# Patient Record
Sex: FEMALE | Race: WHITE | NOT HISPANIC OR LATINO
[De-identification: names, ages, dates, MRNs, and addresses within clinical notes are randomized per-mention and may not be internally consistent; named-entity substitution may affect disease eponyms.]

---

## 2017-06-16 ENCOUNTER — OTHER (OUTPATIENT)
Age: 52
End: 2017-06-16

## 2019-10-09 ENCOUNTER — TRANSCRIPTION ENCOUNTER (OUTPATIENT)
Age: 54
End: 2019-10-09

## 2019-12-06 ENCOUNTER — APPOINTMENT (OUTPATIENT)
Dept: GASTROENTEROLOGY | Facility: CLINIC | Age: 54
End: 2019-12-06
Payer: COMMERCIAL

## 2019-12-06 VITALS
OXYGEN SATURATION: 100 % | HEART RATE: 71 BPM | DIASTOLIC BLOOD PRESSURE: 77 MMHG | BODY MASS INDEX: 26.46 KG/M2 | WEIGHT: 155 LBS | HEIGHT: 64 IN | SYSTOLIC BLOOD PRESSURE: 109 MMHG

## 2019-12-06 DIAGNOSIS — Z80.0 FAMILY HISTORY OF MALIGNANT NEOPLASM OF DIGESTIVE ORGANS: ICD-10-CM

## 2019-12-06 DIAGNOSIS — Z12.11 ENCOUNTER FOR SCREENING FOR MALIGNANT NEOPLASM OF COLON: ICD-10-CM

## 2019-12-06 DIAGNOSIS — R14.0 ABDOMINAL DISTENSION (GASEOUS): ICD-10-CM

## 2019-12-06 DIAGNOSIS — R10.9 UNSPECIFIED ABDOMINAL PAIN: ICD-10-CM

## 2019-12-06 PROCEDURE — 99204 OFFICE O/P NEW MOD 45 MIN: CPT

## 2019-12-06 RX ORDER — SODIUM SULFATE, POTASSIUM SULFATE, MAGNESIUM SULFATE 17.5; 3.13; 1.6 G/ML; G/ML; G/ML
17.5-3.13-1.6 SOLUTION, CONCENTRATE ORAL
Qty: 1 | Refills: 0 | Status: ACTIVE | COMMUNITY
Start: 2019-12-06 | End: 1900-01-01

## 2019-12-06 NOTE — HISTORY OF PRESENT ILLNESS
[de-identified] : 54-year-old woman with history of asthma who presents with complaints of abdominal pain and changes in her bowel habits.\par \par The patient reports that her mother had colon cancer in her late 40s.  Maternal cousin in 40's had colon cancer.  She says her father  from liver cirrhosis. \par \par She's had about 3 or 4 colonoscopies in her lifetime. Her last colonoscopy was 5 years ago. No colon polyps are ever discovered.\par \par She's had an upper endoscopy more than 5 years ago. From her recollection nothing serious was seen.\par \par She says she's had digestive issues since being a young child. For the past year or more she's had left sided abdominal pain. She says this pain can last for several hours. She says the pain is 5/10 on a pain scale.  At times pain gets better after belching or having a bowel movement.  \par \par She's also noticed a change in her bowel habits since the past 6 months. She used to go to bathroom once a day. She now goes to the bathroom maybe every 3 days. She does not feel empty after a bowel movement. She denies any melena, hematochezia, abnormal weight loss.\par \par She says after eating certain foods like salads she will have abdominal pain.\par \par Yesterday for the first time she was nauseous all day. She denies any vomiting.\par \par She denies any heartburn, reflux, regurgitation, dysphagia, and odynophagia.\par \par She is worried about leaky gut.  \par \par All other review of systems are negative.  Denies cardiac symptoms.

## 2019-12-06 NOTE — PHYSICAL EXAM
[General Appearance - Alert] : alert [General Appearance - In No Acute Distress] : in no acute distress [Sclera] : the sclera and conjunctiva were normal [Extraocular Movements] : extraocular movements were intact [Outer Ear] : the ears and nose were normal in appearance [Hearing Threshold Finger Rub Not [place]] : hearing was normal [Neck Appearance] : the appearance of the neck was normal [Neck Cervical Mass (___cm)] : no neck mass was observed [Auscultation Breath Sounds / Voice Sounds] : lungs were clear to auscultation bilaterally [Heart Rate And Rhythm] : heart rate was normal and rhythm regular [Heart Sounds] : normal S1 and S2 [Heart Sounds Gallop] : no gallops [Murmurs] : no murmurs [Heart Sounds Pericardial Friction Rub] : no pericardial rub [Bowel Sounds] : normal bowel sounds [Abdomen Soft] : soft [Abdomen Tenderness] : non-tender [Cervical Lymph Nodes Enlarged Posterior Bilaterally] : posterior cervical [Cervical Lymph Nodes Enlarged Anterior Bilaterally] : anterior cervical [Supraclavicular Lymph Nodes Enlarged Bilaterally] : supraclavicular [Abnormal Walk] : normal gait [Nail Clubbing] : no clubbing  or cyanosis of the fingernails [Skin Color & Pigmentation] : normal skin color and pigmentation [] : no rash [Oriented To Time, Place, And Person] : oriented to person, place, and time [Impaired Insight] : insight and judgment were intact [Affect] : the affect was normal

## 2019-12-06 NOTE — ASSESSMENT
[FreeTextEntry1] : 54-year-old woman with history of asthma, family hx of colon cancer who presents with complaints of abdominal pain and changes in her bowel habits.\par \par I will plan for an upper endoscopy under monitored anesthesia care to rule out peptic ulcer disease, H.pylori gastritis etc.   I will therefore plan for a colonoscopy to rule out colon polyps, colorectal cancer etc. under monitored anesthesia care.  Risks such as perforation requiring surgery, bleeding, infection, colitis, missed colon cancer, internal organ injury, etc, risks of bowel prep including colitis, syncope etc. and risks of anesthesia including cardiopulmonary compromise were discussed with patient.  Patient verbalized understanding and agrees to proceed with the planned procedure.\par \par Metamucil once a day\par \par Advised genetic testing - contact information for department of medical genetics given.  \par \par If about procedures negative and patient continues to have ongoing pain, will consider CT scan. \par

## 2020-03-12 ENCOUNTER — TRANSCRIPTION ENCOUNTER (OUTPATIENT)
Age: 55
End: 2020-03-12

## 2020-03-13 ENCOUNTER — RESULT REVIEW (OUTPATIENT)
Age: 55
End: 2020-03-13

## 2020-03-13 ENCOUNTER — APPOINTMENT (OUTPATIENT)
Dept: GASTROENTEROLOGY | Facility: HOSPITAL | Age: 55
End: 2020-03-13

## 2020-03-13 ENCOUNTER — OUTPATIENT (OUTPATIENT)
Dept: OUTPATIENT SERVICES | Facility: HOSPITAL | Age: 55
LOS: 1 days | End: 2020-03-13
Payer: COMMERCIAL

## 2020-03-13 DIAGNOSIS — K21.9 GASTRO-ESOPHAGEAL REFLUX DISEASE WITHOUT ESOPHAGITIS: ICD-10-CM

## 2020-03-13 DIAGNOSIS — Z12.11 ENCOUNTER FOR SCREENING FOR MALIGNANT NEOPLASM OF COLON: ICD-10-CM

## 2020-03-13 LAB — HCG UR QL: NEGATIVE — SIGNIFICANT CHANGE UP

## 2020-03-13 PROCEDURE — 43239 EGD BIOPSY SINGLE/MULTIPLE: CPT

## 2020-03-13 PROCEDURE — 88305 TISSUE EXAM BY PATHOLOGIST: CPT

## 2020-03-13 PROCEDURE — 81025 URINE PREGNANCY TEST: CPT

## 2020-03-13 PROCEDURE — 88312 SPECIAL STAINS GROUP 1: CPT

## 2020-03-13 PROCEDURE — 88305 TISSUE EXAM BY PATHOLOGIST: CPT | Mod: 26

## 2020-03-13 PROCEDURE — 45378 DIAGNOSTIC COLONOSCOPY: CPT

## 2020-03-13 PROCEDURE — 88312 SPECIAL STAINS GROUP 1: CPT | Mod: 26

## 2020-03-13 PROCEDURE — G0105: CPT

## 2020-12-21 PROBLEM — Z12.11 ENCOUNTER FOR SCREENING COLONOSCOPY: Status: RESOLVED | Noted: 2019-12-06 | Resolved: 2020-12-21

## 2022-03-28 ENCOUNTER — EMERGENCY (EMERGENCY)
Facility: HOSPITAL | Age: 57
LOS: 0 days | Discharge: HOME | End: 2022-03-29
Attending: EMERGENCY MEDICINE
Payer: COMMERCIAL

## 2022-03-28 VITALS — HEIGHT: 64 IN | WEIGHT: 154.98 LBS

## 2022-03-28 DIAGNOSIS — R10.9 UNSPECIFIED ABDOMINAL PAIN: ICD-10-CM

## 2022-03-28 LAB
ALBUMIN SERPL ELPH-MCNC: 4 G/DL — SIGNIFICANT CHANGE UP (ref 3.3–5)
ALP SERPL-CCNC: 48 U/L — SIGNIFICANT CHANGE UP (ref 40–120)
ALT FLD-CCNC: 25 U/L — SIGNIFICANT CHANGE UP (ref 12–78)
ANION GAP SERPL CALC-SCNC: 4 MMOL/L — LOW (ref 5–17)
APPEARANCE UR: CLEAR — SIGNIFICANT CHANGE UP
AST SERPL-CCNC: 30 U/L — SIGNIFICANT CHANGE UP (ref 15–37)
BACTERIA # UR AUTO: NEGATIVE — SIGNIFICANT CHANGE UP
BASOPHILS # BLD AUTO: 0.04 K/UL — SIGNIFICANT CHANGE UP (ref 0–0.2)
BASOPHILS NFR BLD AUTO: 0.4 % — SIGNIFICANT CHANGE UP (ref 0–2)
BILIRUB SERPL-MCNC: 1.2 MG/DL — SIGNIFICANT CHANGE UP (ref 0.2–1.2)
BILIRUB UR-MCNC: NEGATIVE — SIGNIFICANT CHANGE UP
BUN SERPL-MCNC: 18 MG/DL — SIGNIFICANT CHANGE UP (ref 7–23)
CALCIUM SERPL-MCNC: 9 MG/DL — SIGNIFICANT CHANGE UP (ref 8.5–10.1)
CHLORIDE SERPL-SCNC: 105 MMOL/L — SIGNIFICANT CHANGE UP (ref 96–108)
CO2 SERPL-SCNC: 28 MMOL/L — SIGNIFICANT CHANGE UP (ref 22–31)
COLOR SPEC: YELLOW — SIGNIFICANT CHANGE UP
CREAT SERPL-MCNC: 0.75 MG/DL — SIGNIFICANT CHANGE UP (ref 0.5–1.3)
DIFF PNL FLD: ABNORMAL
EGFR: 93 ML/MIN/1.73M2 — SIGNIFICANT CHANGE UP
EOSINOPHIL # BLD AUTO: 0.51 K/UL — HIGH (ref 0–0.5)
EOSINOPHIL NFR BLD AUTO: 4.6 % — SIGNIFICANT CHANGE UP (ref 0–6)
EPI CELLS # UR: SIGNIFICANT CHANGE UP
GLUCOSE SERPL-MCNC: 95 MG/DL — SIGNIFICANT CHANGE UP (ref 70–99)
GLUCOSE UR QL: NEGATIVE — SIGNIFICANT CHANGE UP
HCT VFR BLD CALC: 40 % — SIGNIFICANT CHANGE UP (ref 34.5–45)
HGB BLD-MCNC: 13.7 G/DL — SIGNIFICANT CHANGE UP (ref 11.5–15.5)
IMM GRANULOCYTES NFR BLD AUTO: 0.3 % — SIGNIFICANT CHANGE UP (ref 0–1.5)
KETONES UR-MCNC: ABNORMAL
LACTATE SERPL-SCNC: 0.8 MMOL/L — SIGNIFICANT CHANGE UP (ref 0.7–2)
LEUKOCYTE ESTERASE UR-ACNC: ABNORMAL
LIDOCAIN IGE QN: 297 U/L — SIGNIFICANT CHANGE UP (ref 73–393)
LYMPHOCYTES # BLD AUTO: 1.85 K/UL — SIGNIFICANT CHANGE UP (ref 1–3.3)
LYMPHOCYTES # BLD AUTO: 16.6 % — SIGNIFICANT CHANGE UP (ref 13–44)
MCHC RBC-ENTMCNC: 31.7 PG — SIGNIFICANT CHANGE UP (ref 27–34)
MCHC RBC-ENTMCNC: 34.3 GM/DL — SIGNIFICANT CHANGE UP (ref 32–36)
MCV RBC AUTO: 92.6 FL — SIGNIFICANT CHANGE UP (ref 80–100)
MONOCYTES # BLD AUTO: 0.7 K/UL — SIGNIFICANT CHANGE UP (ref 0–0.9)
MONOCYTES NFR BLD AUTO: 6.3 % — SIGNIFICANT CHANGE UP (ref 2–14)
NEUTROPHILS # BLD AUTO: 8.04 K/UL — HIGH (ref 1.8–7.4)
NEUTROPHILS NFR BLD AUTO: 71.8 % — SIGNIFICANT CHANGE UP (ref 43–77)
NITRITE UR-MCNC: NEGATIVE — SIGNIFICANT CHANGE UP
PH UR: 5 — SIGNIFICANT CHANGE UP (ref 5–8)
PLATELET # BLD AUTO: 222 K/UL — SIGNIFICANT CHANGE UP (ref 150–400)
POTASSIUM SERPL-MCNC: 4 MMOL/L — SIGNIFICANT CHANGE UP (ref 3.5–5.3)
POTASSIUM SERPL-SCNC: 4 MMOL/L — SIGNIFICANT CHANGE UP (ref 3.5–5.3)
PROT SERPL-MCNC: 7.2 GM/DL — SIGNIFICANT CHANGE UP (ref 6–8.3)
PROT UR-MCNC: NEGATIVE — SIGNIFICANT CHANGE UP
RBC # BLD: 4.32 M/UL — SIGNIFICANT CHANGE UP (ref 3.8–5.2)
RBC # FLD: 12.1 % — SIGNIFICANT CHANGE UP (ref 10.3–14.5)
RBC CASTS # UR COMP ASSIST: SIGNIFICANT CHANGE UP /HPF (ref 0–4)
SODIUM SERPL-SCNC: 137 MMOL/L — SIGNIFICANT CHANGE UP (ref 135–145)
SP GR SPEC: 1.01 — SIGNIFICANT CHANGE UP (ref 1.01–1.02)
UROBILINOGEN FLD QL: NEGATIVE — SIGNIFICANT CHANGE UP
WBC # BLD: 11.17 K/UL — HIGH (ref 3.8–10.5)
WBC # FLD AUTO: 11.17 K/UL — HIGH (ref 3.8–10.5)
WBC UR QL: SIGNIFICANT CHANGE UP

## 2022-03-28 PROCEDURE — 96374 THER/PROPH/DIAG INJ IV PUSH: CPT

## 2022-03-28 PROCEDURE — 74176 CT ABD & PELVIS W/O CONTRAST: CPT | Mod: MA

## 2022-03-28 PROCEDURE — 85025 COMPLETE CBC W/AUTO DIFF WBC: CPT

## 2022-03-28 PROCEDURE — 74177 CT ABD & PELVIS W/CONTRAST: CPT | Mod: 26,MA

## 2022-03-28 PROCEDURE — 74177 CT ABD & PELVIS W/CONTRAST: CPT | Mod: MA

## 2022-03-28 PROCEDURE — 99285 EMERGENCY DEPT VISIT HI MDM: CPT

## 2022-03-28 PROCEDURE — 87086 URINE CULTURE/COLONY COUNT: CPT

## 2022-03-28 PROCEDURE — 80053 COMPREHEN METABOLIC PANEL: CPT

## 2022-03-28 PROCEDURE — 36415 COLL VENOUS BLD VENIPUNCTURE: CPT

## 2022-03-28 PROCEDURE — 99284 EMERGENCY DEPT VISIT MOD MDM: CPT | Mod: 25

## 2022-03-28 PROCEDURE — 83690 ASSAY OF LIPASE: CPT

## 2022-03-28 PROCEDURE — 81001 URINALYSIS AUTO W/SCOPE: CPT

## 2022-03-28 PROCEDURE — 83605 ASSAY OF LACTIC ACID: CPT

## 2022-03-28 RX ORDER — KETOROLAC TROMETHAMINE 30 MG/ML
15 SYRINGE (ML) INJECTION ONCE
Refills: 0 | Status: DISCONTINUED | OUTPATIENT
Start: 2022-03-28 | End: 2022-03-28

## 2022-03-28 RX ORDER — SODIUM CHLORIDE 9 MG/ML
1000 INJECTION INTRAMUSCULAR; INTRAVENOUS; SUBCUTANEOUS ONCE
Refills: 0 | Status: COMPLETED | OUTPATIENT
Start: 2022-03-28 | End: 2022-03-28

## 2022-03-28 RX ADMIN — SODIUM CHLORIDE 2000 MILLILITER(S): 9 INJECTION INTRAMUSCULAR; INTRAVENOUS; SUBCUTANEOUS at 21:53

## 2022-03-28 RX ADMIN — Medication 15 MILLIGRAM(S): at 21:53

## 2022-03-28 NOTE — ED STATDOCS - CLINICAL SUMMARY MEDICAL DECISION MAKING FREE TEXT BOX
will evaluate for SBO vs. appy vs. biliary colic vs. colitis. dispo pending, labs imaging and reassessment.

## 2022-03-28 NOTE — ED STATDOCS - GASTROINTESTINAL, MLM
abdomen soft, and non-distended. Bowel sounds present.  + moderate diffuse TTP, no rebound or guarding

## 2022-03-28 NOTE — ED STATDOCS - PATIENT PORTAL LINK FT
You can access the FollowMyHealth Patient Portal offered by [facility] by registering at the following website: [email or web address]. By joining [employer]’s FollowMyHealth portal, you will also be able to view your health information using other applications (apps) compatible with our system.

## 2022-03-28 NOTE — ED ADULT NURSE NOTE - OBJECTIVE STATEMENT
Pt. reports to ED for abdominal pain.  Pt. reports for past few weeks intermittent abdominal pain in RLQ.  Pt. reports pain came back today RLQ pain with radiation to LLQ.  Pt. reports nausea, denies vomiting, fevers, chest pain, SOB.

## 2022-03-28 NOTE — ED STATDOCS - CARE PROVIDER_API CALL
Jerry Schrader)  Surgery  [address], [address]  [place] [address]  Phone: [phone]  Fax: [phone]  Follow Up Time:     Jerry Piedra)  ColonRectal Surgery; Surgery  [address], [address]  [place] [address]  Phone: [phone]  Fax: [phone]  Follow Up Time:

## 2022-03-28 NOTE — ED ADULT NURSE NOTE - NSIMPLEMENTINTERV_GEN_ALL_ED
Implemented All Universal Safety Interventions:  [place] to call system. Call bell, personal items and telephone within reach. Instruct patient to call for assistance. Room bathroom lighting operational. Non-slip footwear when patient is off stretcher. Physically safe environment: no spills, clutter or unnecessary equipment. Stretcher in lowest position, wheels locked, appropriate side rails in place.

## 2022-03-28 NOTE — ED STATDOCS - NSFOLLOWUPINSTRUCTIONS_ED_ALL_ED_FT
Please see your gastroenterologist within 1-2 days .  See a general surgeon or colorectal surgeon within 1-2 weeks.    take tylenol or ibuprofen as needed for pain.     Clears and soft food for 1-2 days then meals as tolerated.

## 2022-03-28 NOTE — ED ADULT TRIAGE NOTE - CHIEF COMPLAINT QUOTE
Pt c/o intermittent RLQ pain radiating to the rest of the abdomen, b/l flank pain, and nausea since 4pm today. Did not take any pain medication PTA. Denies vomiting/diarrhea, chest pain, SOB, urinary symptoms, and fevers.

## 2022-03-28 NOTE — ED STATDOCS - OBJECTIVE STATEMENT
56 F no hx here c/o right sided burning abdominal pain x 3PM today. pt reports she had multiple episodes of similar abd pain in past few weeks. reports associated symptoms of nausea and abdominal bloating.  pt reports that today she had a normal BM but that a few days ago, her BM was "gooey". no vomiting.  no fever, chills, dysuria, hematuria. no hx of kidney stones. no hx of abdominal surgeries.  no recent antibiotic use. no hx of diverticulosis, diverticulitis.

## 2022-03-28 NOTE — ED STATDOCS - PROGRESS NOTE DETAILS
pt reeval and aware of her labs and ct results. spoke to Surgical resident and will consult with attending of ct finding, may need ct abd/pelvis with oral contrast. -Angela Au PA-C pt aware she may have to have CT with oral contrast and agrees with plan. -Angela Au PA-C ARYAG:  Received signout from Dr. Iverson to follow up CT study with PO contrast which now is normal and does not show intussusception.  Surgery resident states ok to discharge at this time.  Patient to follow up with her gastroenterologist in [place] and general surgery or colorectal surgery.

## 2022-03-29 VITALS
SYSTOLIC BLOOD PRESSURE: 114 MMHG | TEMPERATURE: 98 F | DIASTOLIC BLOOD PRESSURE: 80 MMHG | HEART RATE: 100 BPM | RESPIRATION RATE: 19 BRPM | OXYGEN SATURATION: 100 %

## 2022-03-29 PROCEDURE — 74176 CT ABD & PELVIS W/O CONTRAST: CPT | Mod: 26,MA

## 2022-03-29 RX ORDER — SODIUM CHLORIDE 9 MG/ML
1000 INJECTION INTRAMUSCULAR; INTRAVENOUS; SUBCUTANEOUS ONCE
Refills: 0 | Status: COMPLETED | OUTPATIENT
Start: 2022-03-29 | End: 2022-03-29

## 2022-03-29 RX ADMIN — SODIUM CHLORIDE 1000 MILLILITER(S): 9 INJECTION INTRAMUSCULAR; INTRAVENOUS; SUBCUTANEOUS at 01:29

## 2022-03-29 NOTE — ED ADULT NURSE REASSESSMENT NOTE - NS ED NURSE REASSESS COMMENT FT1
Assumed care of patient from Nicol CASTILLO. Patient resting comfortably in bed A&OX4. Respirations even and unlabored. Skin warm and dry. Patient updated on POC. Will continue to monitor.

## 2022-03-29 NOTE — CONSULT NOTE ADULT - ASSESSMENT
55 yo F with abdominal pain with possible intussusception  wbc 11  LA 0.8    Plan;  Please obtain CT a/p w/oral contrast  zofran prn n/v  pain control  IVF      Plan d/w Dr. Schrader 57 yo F with abdominal pain with possible intussusception  wbc 11  LA 0.8    Plan;  Please obtain CT a/p w/oral contrast  zofran prn n/v  pain control  IVF      Plan d/w Dr. Schrader    Addendum:    CT a/p w/oral contrast reviewed with radiologist, the suspected intussusception on prior CT has resolved on repeat, pt is clinically better and denies any abdominal pain.  CT official report: Oral contrast has passed into the colon. Previously seen small bowel   intussusception has resolved. There is no obstruction. No mass is seen on   this exam.    Pt can be discharged with oral pain medications  advised if she develops similar pain to rtn to ED    plan d/w Dr. Schrader

## 2022-03-29 NOTE — CONSULT NOTE ADULT - SUBJECTIVE AND OBJECTIVE BOX
Surgery Consultation    57 yo F with PMH of Asthma seen in the ED with c/o right sided burning abdominal pain earlier this afternoon. Pt reports she had multiple episodes of similar abd pain in past few weeks associated w/nausea but no vomiting and abdominal bloating.  Pt reports she had nl BM earlier today denies passing flatus. Denies fever or chills, v/d/c.    PAST MEDICAL & SURGICAL HISTORY:    PMH: Asthma  PSH: Denies    Allergies:  suflur- GI upset    SH: denies toxic habits    Home Medications:      Family History:  FAMILY HISTORY:      ROS:  Constitutional: Denies fever, fatigue or weight loss.  Skin: Denies rash.  Eyes: Denies recent vision problems or eye pain.  ENT: Denies congestion, ear pain, or sore throat.  Endocrine: Denies thyroid problems.  Cardiovascular: Denies chest pain or palpation.  Respiratory: Denies cough, shortness of breath, congestion, or wheezing.  Gastrointestinal: + abdominal pain, +nausea, denies vomiting, or diarrhea.  Genitourinary: Denies dysuria.  Musculoskeletal: Denies joint swelling.  Neurologic: Denies headache.      Physical Examination:  AVSS  GENERAL: No acute distress, well-developed  HEAD:  Atraumatic, Normocephalic  CHEST/LUNG: CTAB; No wheezes, rales, or rhonchi  HEART: Regular rate and rhythm; No murmurs, rubs, or gallops  ABDOMEN: Soft, mild tenderness RLQ, non-distended; bowel sounds+  ext:no cyanosis or edema  NEUROLOGY: A&O x 3, no focal deficits    Data:                        13.7   11.17 )-----------( 222      ( 28 Mar 2022 21:05 )             40.0     -    137  |  105  |  18  ----------------------------<  95  4.0   |  28  |  0.75    Ca    9.0      28 Mar 2022 21:05    TPro  7.2  /  Alb  4.0  /  TBili  1.2  /  DBili  x   /  AST  30  /  ALT  25  /  AlkPhos  48  -      LIVER FUNCTIONS - ( 28 Mar 2022 21:05 )  Alb: 4.0 g/dL / Pro: 7.2 gm/dL / ALK PHOS: 48 U/L / ALT: 25 U/L / AST: 30 U/L / GGT: x           Urinalysis Basic - ( 28 Mar 2022 22:17 )    Color: Yellow / Appearance: Clear / S.015 / pH: x  Gluc: x / Ketone: Moderate  / Bili: Negative / Urobili: Negative   Blood: x / Protein: Negative / Nitrite: Negative   Leuk Esterase: Trace / RBC: 0-2 /HPF / WBC 0-2   Sq Epi: x / Non Sq Epi: Occasional / Bacteria: Negative        Radiology:    CT a/p w/iv contrast: At the lower midline to the right lower quadrant abdomen there are is an   intussusception of distal small bowel containing fat. At the point of   intussusception there is a prominent 3.9 x 2.5 cm focal area of solid   soft tissue without air. This could be compressed bowel however a solid   mass as an intussusception lead point is not excluded. Proximal to this   the small bowel is fluid-filled and mildly prominent in diameter   measuring up to 2 cm relative to the bowel distal to the intussusception   (2:82-83) (602:31-43). There is no surrounding inflammatory change. There   is no free air. This may be the cause of the patient's pain.

## 2022-03-30 LAB
CULTURE RESULTS: SIGNIFICANT CHANGE UP
SPECIMEN SOURCE: SIGNIFICANT CHANGE UP

## 2022-04-01 ENCOUNTER — APPOINTMENT (OUTPATIENT)
Dept: GASTROENTEROLOGY | Facility: CLINIC | Age: 57
End: 2022-04-01
Payer: COMMERCIAL

## 2022-04-01 ENCOUNTER — NON-APPOINTMENT (OUTPATIENT)
Age: 57
End: 2022-04-01

## 2022-04-01 PROCEDURE — 99214 OFFICE O/P EST MOD 30 MIN: CPT

## 2022-04-01 NOTE — ASSESSMENT
[FreeTextEntry1] : IMPRESSION:\par #  RLQ pain - [facility] ER visit 3/28/2022\par -  CT scan A/P with oral contrast only 3/29/2022 - oral contrast passed into the colon - previous small bowel intussusception has resolved.  No mass, no obstruction\par -  CT scan A/P with IV contrast on 3/28/2022 - Lower midline to the RLQ, there is an intussusception of distal small bowel containing fat.  At the point of intussusception is a prominent 3.9 x 2.5 cm focal are of solid soft tissue without air.  This could be compressed small bowel however a solid mass as an intussusception lead point is not excluded. \par -  WBC of 11.7\par -  Colonoscopy in 3/13/2020:  Mild hemorrhoids.  Good prep. Repeat colonoscopy in 5 years or earlier depending on genetic testing. \par -   3 or 4 colonoscopies prior to 2020. No colon polyps are ever discovered.\par \par #  Years of left sided abdominal pain, better after belching or having a BM\par -  EGD 3/13/2020 by Dr. Patterson:  Normal GE s/p bx (negative pathology), Mild antral erythema s/p bx (negative for IM and HP), normal duodenum s/p bx (negative for celiac disease) \par \par #  Family history of colon cancer\par -  Mother had colon cancer in her late 40s. Maternal cousin in 40's had colon cancer. \par -  Previously advised genetic testing - contact information for department of medical genetics given. \par \par #  Comorbidities:  History of asthma\par \par \par PLAN: \par Unclear cause of small bowel intussusception - discussed a capsule endoscopy to r/o small bowel lesion etc risk reviewed such as bowel obstruction requiring operation, missed lesions etc  - alternatives discussed such as MR or CT enterography - limits reviewed such as missed lesions- she would like to arrange for a capsule endoscopy - she was advised not to have it at time of abdominal pain - she verbalized understanding. \par \par Discussed a colonoscopy since new onset constipation and family history - risks/benefits reviewed - she will hold off until assessment of small bowel.  MiraLAX once a day for constipation.\par \par Advised if sever abdominal pain - go to ER.

## 2022-04-01 NOTE — PHYSICAL EXAM
[General Appearance - Alert] : alert [General Appearance - In No Acute Distress] : in no acute distress [Sclera] : the sclera and conjunctiva were normal [Extraocular Movements] : extraocular movements were intact [Outer Ear] : the ears and nose were normal in appearance [Hearing Threshold Finger Rub Not [place]] : hearing was normal [Neck Appearance] : the appearance of the neck was normal [Neck Cervical Mass (___cm)] : no neck mass was observed [Auscultation Breath Sounds / Voice Sounds] : lungs were clear to auscultation bilaterally [Heart Rate And Rhythm] : heart rate was normal and rhythm regular [Heart Sounds] : normal S1 and S2 [Heart Sounds Gallop] : no gallops [Murmurs] : no murmurs [Heart Sounds Pericardial Friction Rub] : no pericardial rub [Bowel Sounds] : normal bowel sounds [Abdomen Soft] : soft [Abdomen Tenderness] : non-tender [Abdomen Mass (___ Cm)] : no abdominal mass palpated [Cervical Lymph Nodes Enlarged Posterior Bilaterally] : posterior cervical [Cervical Lymph Nodes Enlarged Anterior Bilaterally] : anterior cervical [Supraclavicular Lymph Nodes Enlarged Bilaterally] : supraclavicular [Abnormal Walk] : normal gait [Nail Clubbing] : no clubbing  or cyanosis of the fingernails [Skin Color & Pigmentation] : normal skin color and pigmentation [] : no rash [Oriented To Time, Place, And Person] : oriented to person, place, and time [Impaired Insight] : insight and judgment were intact [Affect] : the affect was normal

## 2022-04-01 NOTE — HISTORY OF PRESENT ILLNESS
[de-identified] : 56-year-old woman with history of asthma who presents with complaints of abdominal pain and changes in her bowel habits.\par \par Went to [facility] on 3/28/2022 for abdominal pain - RLQ - 4th episode (first ER visit) - feels like gas- bloated - then pain - sever pain - symptoms can last for several hours.  She gets nausea but no vomiting. \par \par She gets constipation  has been having irregular bowel movements since 2022.  \par Patient denies having weight loss, melena and hematochezia.\par \par \par \par \par Initial office visit 2019:  \par The patient reports that her mother had colon cancer in her late 40s. Maternal cousin in 40's had colon cancer. She says her father  from liver cirrhosis. \par \par She's had about 3 or 4 colonoscopies in her lifetime. Her last colonoscopy was 5 years ago. No colon polyps are ever discovered.\par \par She's had an upper endoscopy more than 5 years ago. From her recollection nothing serious was seen.\par \par She says she's had digestive issues since being a young child. For the past year or more she's had left sided abdominal pain. She says this pain can last for several hours. She says the pain is 5/10 on a pain scale. At times pain gets better after belching or having a bowel movement. \par \par She's also noticed a change in her bowel habits since the past 6 months. She used to go to bathroom once a day. She now goes to the bathroom maybe every 3 days. She does not feel empty after a bowel movement. She denies any melena, hematochezia, abnormal weight loss.\par \par She says after eating certain foods like salads she will have abdominal pain.\par \par Yesterday for the first time she was nauseous all day. She denies any vomiting.\par \par She denies any heartburn, reflux, regurgitation, dysphagia, and odynophagia.\par \par She is worried about leaky gut.

## 2022-04-02 ENCOUNTER — EMERGENCY (EMERGENCY)
Facility: HOSPITAL | Age: 57
LOS: 0 days | Discharge: HOME | End: 2022-04-03
Attending: EMERGENCY MEDICINE
Payer: COMMERCIAL

## 2022-04-02 VITALS
DIASTOLIC BLOOD PRESSURE: 85 MMHG | HEIGHT: 64 IN | WEIGHT: 156.97 LBS | RESPIRATION RATE: 17 BRPM | TEMPERATURE: 99 F | OXYGEN SATURATION: 100 % | HEART RATE: 63 BPM | SYSTOLIC BLOOD PRESSURE: 122 MMHG

## 2022-04-02 DIAGNOSIS — R10.9 UNSPECIFIED ABDOMINAL PAIN: ICD-10-CM

## 2022-04-02 DIAGNOSIS — Z20.822 CONTACT WITH AND (SUSPECTED) EXPOSURE TO COVID-19: ICD-10-CM

## 2022-04-02 LAB
ALBUMIN SERPL ELPH-MCNC: 3.8 G/DL — SIGNIFICANT CHANGE UP (ref 3.3–5)
ALP SERPL-CCNC: 60 U/L — SIGNIFICANT CHANGE UP (ref 40–120)
ALT FLD-CCNC: 24 U/L — SIGNIFICANT CHANGE UP (ref 12–78)
ANION GAP SERPL CALC-SCNC: 6 MMOL/L — SIGNIFICANT CHANGE UP (ref 5–17)
APPEARANCE UR: ABNORMAL
AST SERPL-CCNC: 23 U/L — SIGNIFICANT CHANGE UP (ref 15–37)
BASOPHILS # BLD AUTO: 0.06 K/UL — SIGNIFICANT CHANGE UP (ref 0–0.2)
BASOPHILS NFR BLD AUTO: 0.7 % — SIGNIFICANT CHANGE UP (ref 0–2)
BILIRUB SERPL-MCNC: 0.6 MG/DL — SIGNIFICANT CHANGE UP (ref 0.2–1.2)
BILIRUB UR-MCNC: NEGATIVE — SIGNIFICANT CHANGE UP
BUN SERPL-MCNC: 19 MG/DL — SIGNIFICANT CHANGE UP (ref 7–23)
CALCIUM SERPL-MCNC: 9.3 MG/DL — SIGNIFICANT CHANGE UP (ref 8.5–10.1)
CHLORIDE SERPL-SCNC: 103 MMOL/L — SIGNIFICANT CHANGE UP (ref 96–108)
CO2 SERPL-SCNC: 30 MMOL/L — SIGNIFICANT CHANGE UP (ref 22–31)
COLOR SPEC: YELLOW — SIGNIFICANT CHANGE UP
CREAT SERPL-MCNC: 0.77 MG/DL — SIGNIFICANT CHANGE UP (ref 0.5–1.3)
DIFF PNL FLD: NEGATIVE — SIGNIFICANT CHANGE UP
EGFR: 90 ML/MIN/1.73M2 — SIGNIFICANT CHANGE UP
EOSINOPHIL # BLD AUTO: 0.36 K/UL — SIGNIFICANT CHANGE UP (ref 0–0.5)
EOSINOPHIL NFR BLD AUTO: 4 % — SIGNIFICANT CHANGE UP (ref 0–6)
GLUCOSE SERPL-MCNC: 104 MG/DL — HIGH (ref 70–99)
GLUCOSE UR QL: NEGATIVE — SIGNIFICANT CHANGE UP
HCT VFR BLD CALC: 39.6 % — SIGNIFICANT CHANGE UP (ref 34.5–45)
HGB BLD-MCNC: 13.6 G/DL — SIGNIFICANT CHANGE UP (ref 11.5–15.5)
IMM GRANULOCYTES NFR BLD AUTO: 0.2 % — SIGNIFICANT CHANGE UP (ref 0–1.5)
KETONES UR-MCNC: NEGATIVE — SIGNIFICANT CHANGE UP
LEUKOCYTE ESTERASE UR-ACNC: NEGATIVE — SIGNIFICANT CHANGE UP
LIDOCAIN IGE QN: 364 U/L — SIGNIFICANT CHANGE UP (ref 73–393)
LYMPHOCYTES # BLD AUTO: 2.74 K/UL — SIGNIFICANT CHANGE UP (ref 1–3.3)
LYMPHOCYTES # BLD AUTO: 30.1 % — SIGNIFICANT CHANGE UP (ref 13–44)
MCHC RBC-ENTMCNC: 32.2 PG — SIGNIFICANT CHANGE UP (ref 27–34)
MCHC RBC-ENTMCNC: 34.3 GM/DL — SIGNIFICANT CHANGE UP (ref 32–36)
MCV RBC AUTO: 93.6 FL — SIGNIFICANT CHANGE UP (ref 80–100)
MONOCYTES # BLD AUTO: 0.68 K/UL — SIGNIFICANT CHANGE UP (ref 0–0.9)
MONOCYTES NFR BLD AUTO: 7.5 % — SIGNIFICANT CHANGE UP (ref 2–14)
NEUTROPHILS # BLD AUTO: 5.25 K/UL — SIGNIFICANT CHANGE UP (ref 1.8–7.4)
NEUTROPHILS NFR BLD AUTO: 57.5 % — SIGNIFICANT CHANGE UP (ref 43–77)
NITRITE UR-MCNC: NEGATIVE — SIGNIFICANT CHANGE UP
PH UR: 8 — SIGNIFICANT CHANGE UP (ref 5–8)
PLATELET # BLD AUTO: 247 K/UL — SIGNIFICANT CHANGE UP (ref 150–400)
POTASSIUM SERPL-MCNC: 3.5 MMOL/L — SIGNIFICANT CHANGE UP (ref 3.5–5.3)
POTASSIUM SERPL-SCNC: 3.5 MMOL/L — SIGNIFICANT CHANGE UP (ref 3.5–5.3)
PROT SERPL-MCNC: 7.1 GM/DL — SIGNIFICANT CHANGE UP (ref 6–8.3)
PROT UR-MCNC: 15
RBC # BLD: 4.23 M/UL — SIGNIFICANT CHANGE UP (ref 3.8–5.2)
RBC # FLD: 12.2 % — SIGNIFICANT CHANGE UP (ref 10.3–14.5)
SODIUM SERPL-SCNC: 139 MMOL/L — SIGNIFICANT CHANGE UP (ref 135–145)
SP GR SPEC: 1.01 — SIGNIFICANT CHANGE UP (ref 1.01–1.02)
UROBILINOGEN FLD QL: NEGATIVE — SIGNIFICANT CHANGE UP
WBC # BLD: 9.11 K/UL — SIGNIFICANT CHANGE UP (ref 3.8–10.5)
WBC # FLD AUTO: 9.11 K/UL — SIGNIFICANT CHANGE UP (ref 3.8–10.5)

## 2022-04-02 PROCEDURE — 80053 COMPREHEN METABOLIC PANEL: CPT

## 2022-04-02 PROCEDURE — U0005: CPT

## 2022-04-02 PROCEDURE — 74177 CT ABD & PELVIS W/CONTRAST: CPT | Mod: MA

## 2022-04-02 PROCEDURE — 99285 EMERGENCY DEPT VISIT HI MDM: CPT | Mod: 25

## 2022-04-02 PROCEDURE — 85025 COMPLETE CBC W/AUTO DIFF WBC: CPT

## 2022-04-02 PROCEDURE — 93010 ELECTROCARDIOGRAM REPORT: CPT

## 2022-04-02 PROCEDURE — 85730 THROMBOPLASTIN TIME PARTIAL: CPT

## 2022-04-02 PROCEDURE — 99285 EMERGENCY DEPT VISIT HI MDM: CPT

## 2022-04-02 PROCEDURE — 81001 URINALYSIS AUTO W/SCOPE: CPT

## 2022-04-02 PROCEDURE — 93005 ELECTROCARDIOGRAM TRACING: CPT

## 2022-04-02 PROCEDURE — 83690 ASSAY OF LIPASE: CPT

## 2022-04-02 PROCEDURE — 87086 URINE CULTURE/COLONY COUNT: CPT

## 2022-04-02 PROCEDURE — U0003: CPT

## 2022-04-02 PROCEDURE — 85610 PROTHROMBIN TIME: CPT

## 2022-04-02 PROCEDURE — 74177 CT ABD & PELVIS W/CONTRAST: CPT | Mod: 26,MA

## 2022-04-02 PROCEDURE — 36415 COLL VENOUS BLD VENIPUNCTURE: CPT

## 2022-04-02 RX ORDER — MORPHINE SULFATE 50 MG/1
4 CAPSULE, EXTENDED RELEASE ORAL ONCE
Refills: 0 | Status: DISCONTINUED | OUTPATIENT
Start: 2022-04-02 | End: 2022-04-02

## 2022-04-02 RX ORDER — SODIUM CHLORIDE 9 MG/ML
1000 INJECTION INTRAMUSCULAR; INTRAVENOUS; SUBCUTANEOUS ONCE
Refills: 0 | Status: COMPLETED | OUTPATIENT
Start: 2022-04-02 | End: 2022-04-02

## 2022-04-02 RX ORDER — ONDANSETRON 8 MG/1
4 TABLET, FILM COATED ORAL ONCE
Refills: 0 | Status: COMPLETED | OUTPATIENT
Start: 2022-04-02 | End: 2022-04-02

## 2022-04-02 RX ADMIN — SODIUM CHLORIDE 1000 MILLILITER(S): 9 INJECTION INTRAMUSCULAR; INTRAVENOUS; SUBCUTANEOUS at 21:57

## 2022-04-02 RX ADMIN — MORPHINE SULFATE 4 MILLIGRAM(S): 50 CAPSULE, EXTENDED RELEASE ORAL at 20:34

## 2022-04-02 RX ADMIN — SODIUM CHLORIDE 1000 MILLILITER(S): 9 INJECTION INTRAMUSCULAR; INTRAVENOUS; SUBCUTANEOUS at 20:11

## 2022-04-02 RX ADMIN — ONDANSETRON 4 MILLIGRAM(S): 8 TABLET, FILM COATED ORAL at 20:11

## 2022-04-02 RX ADMIN — MORPHINE SULFATE 4 MILLIGRAM(S): 50 CAPSULE, EXTENDED RELEASE ORAL at 20:12

## 2022-04-02 NOTE — ED ADULT TRIAGE NOTE - CHIEF COMPLAINT QUOTE
patient bibems from home c/o RUQ abdominal pain. patient states that she was discharged from  on monday and was dx with "inner introsusception". c/o pain 8/10. allergic to sulfa drugs.

## 2022-04-02 NOTE — ED PROVIDER NOTE - OBJECTIVE STATEMENT
56 year old female with PMHx of intussusception presents to the ED for possible episode of intussusception. Pt reports that she had an episode on Monday which self resolved, when before she could get her second CT with contrast in ED. Pt  had two prior episodes within the past 3 weeks. Pt saw Dr. Patterson GI yesterday and was scheduled for a swallowing test next week. Dr. Patterson also informed pt if she has a subsequent episode of intussusception, then to come to ED for CT scan. Had 3-4 bowel movements earlier today.  Pt is burping. No other injuries or complaints.

## 2022-04-02 NOTE — ED PROVIDER NOTE - PROGRESS NOTE DETAILS
Pt improved, tolerating po, pt has appointment with Dr Garner this Tuesday. Precautions reviewed. Pt likely had intussusception again that resolved.

## 2022-04-02 NOTE — ED PROVIDER NOTE - CLINICAL SUMMARY MEDICAL DECISION MAKING FREE TEXT BOX
pt with fourth episode of intussusception this month. here with abd pain. impressionable intussusception. Check labs, CT, and reassess.

## 2022-04-02 NOTE — ED PROVIDER NOTE - CARE PROVIDER_API CALL
Jerry Piedra)  ColonRectal Surgery; Surgery  [address], [address]  [place] [address]  Phone: [phone]  Fax: [phone]  Follow Up Time: 1-3 Days

## 2022-04-02 NOTE — ED PROVIDER NOTE - PATIENT PORTAL LINK FT
You can access the FollowMyHealth Patient Portal offered by [facility] by registering at the following website: [email or web address]. By joining [employer]’s FollowMyHealth portal, you will also be able to view your health information using other applications (apps) compatible with our system.

## 2022-04-02 NOTE — ED ADULT NURSE REASSESSMENT NOTE - NS ED NURSE REASSESS COMMENT FT1
Assumed care of Pt from JONATHAN Langston. Pt resting comfortably in stretcher. Pt's sister at bedside. Vital signs stable. Patient safety maintained. Pending results of CT scan. No additional requests or complaints at this time. Will continue to monitor.

## 2022-04-03 VITALS
OXYGEN SATURATION: 97 % | DIASTOLIC BLOOD PRESSURE: 77 MMHG | TEMPERATURE: 98 F | SYSTOLIC BLOOD PRESSURE: 105 MMHG | HEART RATE: 61 BPM | RESPIRATION RATE: 15 BRPM

## 2022-04-03 PROBLEM — Z78.9 OTHER SPECIFIED HEALTH STATUS: Chronic | Status: ACTIVE | Noted: 2022-04-02

## 2022-04-03 LAB
CULTURE RESULTS: SIGNIFICANT CHANGE UP
SARS-COV-2 RNA SPEC QL NAA+PROBE: SIGNIFICANT CHANGE UP
SPECIMEN SOURCE: SIGNIFICANT CHANGE UP

## 2022-04-03 NOTE — ED ADULT NURSE REASSESSMENT NOTE - NS ED NURSE REASSESS COMMENT FT1
Pt resting comfortably in stretcher. Vital signs stable. Patient safety maintained. IV discontinued. Discharge instructions reviewed.

## 2022-04-05 ENCOUNTER — APPOINTMENT (OUTPATIENT)
Dept: COLORECTAL SURGERY | Facility: CLINIC | Age: 57
End: 2022-04-05
Payer: COMMERCIAL

## 2022-04-05 VITALS
WEIGHT: 157 LBS | TEMPERATURE: 97.4 F | HEIGHT: 64 IN | HEART RATE: 84 BPM | DIASTOLIC BLOOD PRESSURE: 77 MMHG | RESPIRATION RATE: 14 BRPM | SYSTOLIC BLOOD PRESSURE: 108 MMHG | BODY MASS INDEX: 26.8 KG/M2

## 2022-04-05 PROCEDURE — 99245 OFF/OP CONSLTJ NEW/EST HI 55: CPT

## 2022-04-05 NOTE — ASSESSMENT
[FreeTextEntry1] : 56-year-old female with intussusception of the small bowel also suggestive of a small bowel tumor increasingly symptomatic with right lower quadrant abdominal pain and extracted symptoms. Recommend MRI enterography, upper GI small bowel follow-through. Recommend a laparoscopic possible open small bowel resection.Risk and benefits of the surgery have been discussed which include bleeding, infection, sepsis, multiorgan failure, inadvertent injury including hollow viscus, solid organ, ureter neurovascular and neurological structures, DVT PE, heart attack, stroke, hernias, recurrence of tumor, leakage of anastomosis requiring reoperation possible stoma and death.

## 2022-04-05 NOTE — DATA REVIEWED
[FreeTextEntry1] : multiple CAT scans demonstrated intussusception the small bowel and small bowel mass

## 2022-04-05 NOTE — REVIEW OF SYSTEMS
[Feeling Poorly] : feeling poorly [As Noted in HPI] : as noted in HPI [Abdominal Pain] : abdominal pain [Negative] : Heme/Lymph

## 2022-04-05 NOTE — CONSULT LETTER
[Dear  ___] : Dear  [unfilled], [Consult Letter:] : I had the pleasure of evaluating your patient, [unfilled]. [Please see my note below.] : Please see my note below. [Consult Closing:] : Thank you very much for allowing me to participate in the care of this patient.  If you have any questions, please do not hesitate to contact me. [Sincerely,] : Sincerely, [FreeTextEntry3] : Jerry Piedra M.D. FACS, FASCRS

## 2022-04-05 NOTE — HISTORY OF PRESENT ILLNESS
[FreeTextEntry1] : Consultation requested by  for small bowel intussusception and mass.56-year-old female with increasing right lower quadrant abdominal painhas been seen in the emergency room several times for this complaint recent CAT scans demonstrated intussusception of the small bowel with a possible small bowel lesion/mass

## 2022-04-05 NOTE — PHYSICAL EXAM
[Abdomen Masses] : No abdominal masses [Abdomen Tenderness] : ~T No ~M abdominal tenderness [Tender] : nontender [JVD] : no jugular venous distention  [Normal Breath Sounds] : Normal breath sounds [Normal Heart Sounds] : normal heart sounds [Normal Rate and Rhythm] : normal rate and rhythm [No Rash or Lesion] : No rash or lesion [Alert] : alert [Oriented to Person] : oriented to person [Oriented to Place] : oriented to place [Oriented to Time] : oriented to time [de-identified] : Looks well in no distress, of stated age. [de-identified] : pupils equal reactive to light normocephalic atraumatic. [de-identified] : moves all 4 extremities appropriately with 5 over 5 strength

## 2022-04-07 RX ORDER — METRONIDAZOLE 500 MG/1
500 TABLET ORAL
Qty: 3 | Refills: 0 | Status: ACTIVE | COMMUNITY
Start: 2022-04-07 | End: 1900-01-01

## 2022-04-07 RX ORDER — NEOMYCIN SULFATE 500 MG/1
500 TABLET ORAL
Qty: 6 | Refills: 0 | Status: ACTIVE | COMMUNITY
Start: 2022-04-07 | End: 1900-01-01

## 2022-04-08 ENCOUNTER — APPOINTMENT (OUTPATIENT)
Dept: GASTROENTEROLOGY | Facility: CLINIC | Age: 57
End: 2022-04-08
Payer: COMMERCIAL

## 2022-04-08 PROCEDURE — 91110 GI TRC IMG INTRAL ESOPH-ILE: CPT

## 2022-04-11 ENCOUNTER — NON-APPOINTMENT (OUTPATIENT)
Age: 57
End: 2022-04-11

## 2022-04-11 ENCOUNTER — TRANSCRIPTION ENCOUNTER (OUTPATIENT)
Age: 57
End: 2022-04-11

## 2022-04-13 ENCOUNTER — RESULT REVIEW (OUTPATIENT)
Age: 57
End: 2022-04-13

## 2022-04-13 ENCOUNTER — OUTPATIENT (OUTPATIENT)
Dept: OUTPATIENT SERVICES | Facility: HOSPITAL | Age: 57
LOS: 1 days | End: 2022-04-13
Payer: COMMERCIAL

## 2022-04-13 DIAGNOSIS — K56.1 INTUSSUSCEPTION: ICD-10-CM

## 2022-04-13 PROCEDURE — 74250 X-RAY XM SM INT 1CNTRST STD: CPT

## 2022-04-13 PROCEDURE — 74250 X-RAY XM SM INT 1CNTRST STD: CPT | Mod: 26

## 2022-04-14 DIAGNOSIS — K56.1 INTUSSUSCEPTION: ICD-10-CM

## 2022-04-15 ENCOUNTER — NON-APPOINTMENT (OUTPATIENT)
Age: 57
End: 2022-04-15

## 2022-04-22 ENCOUNTER — RESULT REVIEW (OUTPATIENT)
Age: 57
End: 2022-04-22

## 2022-04-26 ENCOUNTER — APPOINTMENT (OUTPATIENT)
Dept: MRI IMAGING | Facility: CLINIC | Age: 57
End: 2022-04-26
Payer: COMMERCIAL

## 2022-04-26 ENCOUNTER — OUTPATIENT (OUTPATIENT)
Dept: OUTPATIENT SERVICES | Facility: HOSPITAL | Age: 57
LOS: 1 days | End: 2022-04-26
Payer: COMMERCIAL

## 2022-04-26 DIAGNOSIS — K56.1 INTUSSUSCEPTION: ICD-10-CM

## 2022-04-26 PROCEDURE — 74183 MRI ABD W/O CNTR FLWD CNTR: CPT

## 2022-04-26 PROCEDURE — 72197 MRI PELVIS W/O & W/DYE: CPT

## 2022-04-26 PROCEDURE — A9585: CPT

## 2022-04-26 PROCEDURE — 72197 MRI PELVIS W/O & W/DYE: CPT | Mod: 26

## 2022-04-26 PROCEDURE — 74183 MRI ABD W/O CNTR FLWD CNTR: CPT | Mod: 26

## 2022-04-29 ENCOUNTER — NON-APPOINTMENT (OUTPATIENT)
Age: 57
End: 2022-04-29

## 2022-05-02 ENCOUNTER — EMERGENCY (EMERGENCY)
Facility: HOSPITAL | Age: 57
LOS: 0 days | Discharge: HOME | End: 2022-05-03
Attending: EMERGENCY MEDICINE
Payer: COMMERCIAL

## 2022-05-02 ENCOUNTER — NON-APPOINTMENT (OUTPATIENT)
Age: 57
End: 2022-05-02

## 2022-05-02 VITALS
HEART RATE: 57 BPM | SYSTOLIC BLOOD PRESSURE: 116 MMHG | WEIGHT: 160.06 LBS | TEMPERATURE: 98 F | HEIGHT: 64 IN | OXYGEN SATURATION: 100 % | RESPIRATION RATE: 18 BRPM | DIASTOLIC BLOOD PRESSURE: 85 MMHG

## 2022-05-02 DIAGNOSIS — R11.0 NAUSEA: ICD-10-CM

## 2022-05-02 DIAGNOSIS — R10.811 RIGHT UPPER QUADRANT ABDOMINAL TENDERNESS: ICD-10-CM

## 2022-05-02 DIAGNOSIS — K59.00 CONSTIPATION, UNSPECIFIED: ICD-10-CM

## 2022-05-02 LAB
BASOPHILS # BLD AUTO: 0.04 K/UL — SIGNIFICANT CHANGE UP (ref 0–0.2)
BASOPHILS NFR BLD AUTO: 0.4 % — SIGNIFICANT CHANGE UP (ref 0–2)
EOSINOPHIL # BLD AUTO: 0.21 K/UL — SIGNIFICANT CHANGE UP (ref 0–0.5)
EOSINOPHIL NFR BLD AUTO: 2 % — SIGNIFICANT CHANGE UP (ref 0–6)
HCT VFR BLD CALC: 39.2 % — SIGNIFICANT CHANGE UP (ref 34.5–45)
HGB BLD-MCNC: 13.4 G/DL — SIGNIFICANT CHANGE UP (ref 11.5–15.5)
IMM GRANULOCYTES NFR BLD AUTO: 0.5 % — SIGNIFICANT CHANGE UP (ref 0–1.5)
LYMPHOCYTES # BLD AUTO: 2.45 K/UL — SIGNIFICANT CHANGE UP (ref 1–3.3)
LYMPHOCYTES # BLD AUTO: 23.3 % — SIGNIFICANT CHANGE UP (ref 13–44)
MCHC RBC-ENTMCNC: 31.7 PG — SIGNIFICANT CHANGE UP (ref 27–34)
MCHC RBC-ENTMCNC: 34.2 GM/DL — SIGNIFICANT CHANGE UP (ref 32–36)
MCV RBC AUTO: 92.7 FL — SIGNIFICANT CHANGE UP (ref 80–100)
MONOCYTES # BLD AUTO: 0.84 K/UL — SIGNIFICANT CHANGE UP (ref 0–0.9)
MONOCYTES NFR BLD AUTO: 8 % — SIGNIFICANT CHANGE UP (ref 2–14)
NEUTROPHILS # BLD AUTO: 6.92 K/UL — SIGNIFICANT CHANGE UP (ref 1.8–7.4)
NEUTROPHILS NFR BLD AUTO: 65.8 % — SIGNIFICANT CHANGE UP (ref 43–77)
PLATELET # BLD AUTO: 298 K/UL — SIGNIFICANT CHANGE UP (ref 150–400)
RBC # BLD: 4.23 M/UL — SIGNIFICANT CHANGE UP (ref 3.8–5.2)
RBC # FLD: 11.9 % — SIGNIFICANT CHANGE UP (ref 10.3–14.5)
WBC # BLD: 10.51 K/UL — HIGH (ref 3.8–10.5)
WBC # FLD AUTO: 10.51 K/UL — HIGH (ref 3.8–10.5)

## 2022-05-02 PROCEDURE — 85610 PROTHROMBIN TIME: CPT

## 2022-05-02 PROCEDURE — G1004: CPT

## 2022-05-02 PROCEDURE — 96361 HYDRATE IV INFUSION ADD-ON: CPT

## 2022-05-02 PROCEDURE — 83690 ASSAY OF LIPASE: CPT

## 2022-05-02 PROCEDURE — 99284 EMERGENCY DEPT VISIT MOD MDM: CPT | Mod: 25

## 2022-05-02 PROCEDURE — 85025 COMPLETE CBC W/AUTO DIFF WBC: CPT

## 2022-05-02 PROCEDURE — 80053 COMPREHEN METABOLIC PANEL: CPT

## 2022-05-02 PROCEDURE — 86901 BLOOD TYPING SEROLOGIC RH(D): CPT

## 2022-05-02 PROCEDURE — 85730 THROMBOPLASTIN TIME PARTIAL: CPT

## 2022-05-02 PROCEDURE — 36415 COLL VENOUS BLD VENIPUNCTURE: CPT

## 2022-05-02 PROCEDURE — 74177 CT ABD & PELVIS W/CONTRAST: CPT | Mod: MG

## 2022-05-02 PROCEDURE — 99285 EMERGENCY DEPT VISIT HI MDM: CPT

## 2022-05-02 PROCEDURE — 83605 ASSAY OF LACTIC ACID: CPT

## 2022-05-02 PROCEDURE — 87086 URINE CULTURE/COLONY COUNT: CPT

## 2022-05-02 PROCEDURE — 96374 THER/PROPH/DIAG INJ IV PUSH: CPT

## 2022-05-02 PROCEDURE — 86850 RBC ANTIBODY SCREEN: CPT

## 2022-05-02 PROCEDURE — 81001 URINALYSIS AUTO W/SCOPE: CPT

## 2022-05-02 PROCEDURE — 96375 TX/PRO/DX INJ NEW DRUG ADDON: CPT

## 2022-05-02 PROCEDURE — 86900 BLOOD TYPING SEROLOGIC ABO: CPT

## 2022-05-02 PROCEDURE — 74177 CT ABD & PELVIS W/CONTRAST: CPT | Mod: 26,MG

## 2022-05-02 RX ORDER — ONDANSETRON 8 MG/1
4 TABLET, FILM COATED ORAL ONCE
Refills: 0 | Status: COMPLETED | OUTPATIENT
Start: 2022-05-02 | End: 2022-05-02

## 2022-05-02 RX ORDER — FAMOTIDINE 10 MG/ML
20 INJECTION INTRAVENOUS ONCE
Refills: 0 | Status: COMPLETED | OUTPATIENT
Start: 2022-05-02 | End: 2022-05-02

## 2022-05-02 RX ORDER — SODIUM CHLORIDE 9 MG/ML
1000 INJECTION INTRAMUSCULAR; INTRAVENOUS; SUBCUTANEOUS ONCE
Refills: 0 | Status: COMPLETED | OUTPATIENT
Start: 2022-05-02 | End: 2022-05-02

## 2022-05-02 RX ORDER — MORPHINE SULFATE 50 MG/1
4 CAPSULE, EXTENDED RELEASE ORAL ONCE
Refills: 0 | Status: DISCONTINUED | OUTPATIENT
Start: 2022-05-02 | End: 2022-05-02

## 2022-05-02 RX ADMIN — ONDANSETRON 4 MILLIGRAM(S): 8 TABLET, FILM COATED ORAL at 23:34

## 2022-05-02 RX ADMIN — MORPHINE SULFATE 4 MILLIGRAM(S): 50 CAPSULE, EXTENDED RELEASE ORAL at 23:34

## 2022-05-02 RX ADMIN — SODIUM CHLORIDE 1000 MILLILITER(S): 9 INJECTION INTRAMUSCULAR; INTRAVENOUS; SUBCUTANEOUS at 23:34

## 2022-05-02 RX ADMIN — FAMOTIDINE 20 MILLIGRAM(S): 10 INJECTION INTRAVENOUS at 23:34

## 2022-05-02 NOTE — ED ADULT TRIAGE NOTE - CHIEF COMPLAINT QUOTE
Pt presents from home due to abdominal pain worse on the RLQ. Pt endorses nausea. Denies vomiting and diarrhea. Last BM was around noon. Hx of intussusception.

## 2022-05-03 LAB
ALBUMIN SERPL ELPH-MCNC: 3.8 G/DL — SIGNIFICANT CHANGE UP (ref 3.3–5)
ALP SERPL-CCNC: 64 U/L — SIGNIFICANT CHANGE UP (ref 40–120)
ALT FLD-CCNC: 23 U/L — SIGNIFICANT CHANGE UP (ref 12–78)
ANION GAP SERPL CALC-SCNC: 4 MMOL/L — LOW (ref 5–17)
APPEARANCE UR: CLEAR — SIGNIFICANT CHANGE UP
APTT BLD: 33.1 SEC — SIGNIFICANT CHANGE UP (ref 27.5–35.5)
AST SERPL-CCNC: 18 U/L — SIGNIFICANT CHANGE UP (ref 15–37)
BILIRUB SERPL-MCNC: 0.7 MG/DL — SIGNIFICANT CHANGE UP (ref 0.2–1.2)
BILIRUB UR-MCNC: NEGATIVE — SIGNIFICANT CHANGE UP
BUN SERPL-MCNC: 9 MG/DL — SIGNIFICANT CHANGE UP (ref 7–23)
CALCIUM SERPL-MCNC: 9.3 MG/DL — SIGNIFICANT CHANGE UP (ref 8.5–10.1)
CHLORIDE SERPL-SCNC: 105 MMOL/L — SIGNIFICANT CHANGE UP (ref 96–108)
CO2 SERPL-SCNC: 30 MMOL/L — SIGNIFICANT CHANGE UP (ref 22–31)
COLOR SPEC: YELLOW — SIGNIFICANT CHANGE UP
CREAT SERPL-MCNC: 0.63 MG/DL — SIGNIFICANT CHANGE UP (ref 0.5–1.3)
DIFF PNL FLD: ABNORMAL
EGFR: 104 ML/MIN/1.73M2 — SIGNIFICANT CHANGE UP
GLUCOSE SERPL-MCNC: 102 MG/DL — HIGH (ref 70–99)
GLUCOSE UR QL: NEGATIVE — SIGNIFICANT CHANGE UP
INR BLD: 1.03 RATIO — SIGNIFICANT CHANGE UP (ref 0.88–1.16)
KETONES UR-MCNC: ABNORMAL
LACTATE SERPL-SCNC: 1.1 MMOL/L — SIGNIFICANT CHANGE UP (ref 0.7–2)
LEUKOCYTE ESTERASE UR-ACNC: ABNORMAL
LIDOCAIN IGE QN: 311 U/L — SIGNIFICANT CHANGE UP (ref 73–393)
NITRITE UR-MCNC: NEGATIVE — SIGNIFICANT CHANGE UP
PH UR: 5 — SIGNIFICANT CHANGE UP (ref 5–8)
POTASSIUM SERPL-MCNC: 3.4 MMOL/L — LOW (ref 3.5–5.3)
POTASSIUM SERPL-SCNC: 3.4 MMOL/L — LOW (ref 3.5–5.3)
PROT SERPL-MCNC: 7.4 GM/DL — SIGNIFICANT CHANGE UP (ref 6–8.3)
PROT UR-MCNC: 15
PROTHROM AB SERPL-ACNC: 11.9 SEC — SIGNIFICANT CHANGE UP (ref 10.5–13.4)
SODIUM SERPL-SCNC: 139 MMOL/L — SIGNIFICANT CHANGE UP (ref 135–145)
SP GR SPEC: 1 — LOW (ref 1.01–1.02)
UROBILINOGEN FLD QL: NEGATIVE — SIGNIFICANT CHANGE UP

## 2022-05-03 RX ORDER — DOCUSATE SODIUM 100 MG
1 CAPSULE ORAL
Qty: 20 | Refills: 0
Start: 2022-05-03 | End: 2022-05-12

## 2022-05-03 RX ADMIN — SODIUM CHLORIDE 1000 MILLILITER(S): 9 INJECTION INTRAMUSCULAR; INTRAVENOUS; SUBCUTANEOUS at 03:10

## 2022-05-03 RX ADMIN — MORPHINE SULFATE 4 MILLIGRAM(S): 50 CAPSULE, EXTENDED RELEASE ORAL at 03:10

## 2022-05-03 NOTE — ED PROVIDER NOTE - OBJECTIVE STATEMENT
57 y/o female in ED c/o worsening RUQ pain today.   pt states h/o intussusception in the past and feels the same.  pt states has been told by her colorectal doctor 57 y/o female in ED c/o worsening RUQ pain today.   pt states h/o intussusception in the past and feels the same.  pt states has been told by her colorectal doctor to come to ED if she has pain.   pt denies any fever, HA, cp, sob, v/d.   states positive nausea.

## 2022-05-03 NOTE — ED ADULT NURSE REASSESSMENT NOTE - NS ED NURSE REASSESS COMMENT FT1
pt refused COVID swabbing. Pt states that she will not be swabbed unless she is admitted. Pt made aware that it could take up to 4 hours after   swab to get results and that could potentially delay her admission or surgery. Pt verbalized understanding.

## 2022-05-03 NOTE — ED PROVIDER NOTE - PATIENT PORTAL LINK FT
You can access the FollowMyHealth Patient Portal offered by [facility] by registering at the following website: [email or web address]. By joining [facility]’s FollowMyHealth portal, you will also be able to view your health information using other applications (apps) compatible with our system.

## 2022-05-03 NOTE — ED PROVIDER NOTE - NSFOLLOWUPINSTRUCTIONS_ED_ALL_ED_FT
please follow up with your colorectal doctor tomorrow as scheduled.   take medication as prescribed.   drink plenty of fluids.   return to ED for any concerns

## 2022-05-03 NOTE — CONSULT NOTE ADULT - SUBJECTIVE AND OBJECTIVE BOX
A 56 year old female who was seen in the ED for abdominal pain that started last night around 5 pm. Pain started in the periumbilical and lower abdominal area, associated with nausea, no vomiting, colicky, diffuse, increasing in intensity, and subsided in the ED with fluids and pain medications. Denies any fever, chest pain, change in bowel habits or blood per rectum. She has had multiple episodes of similar pain in the past 2 months and was told that she was having intussusception for which she was worked up thoroughly and all her workup was negative. She was scheduled to follow up with Dr Piedra this Wednesday to discuss the need for surgery. The attack that she had tonight is less severe than the previous attacks and did not last as long    O/E:  Afebrile, stable V/S  Alert, conscious, oriented  Not in pain or distress  No pallor, jaundice or cyanosis  Chest clear, equal air entry bilaterally  Abdomen: Soft, mild tenderness in the right lower abdomen, no guarding or rigidity  Extremities: No swelling or tenderness                          13.4   10.51 )-----------( 298      ( 02 May 2022 23:31 )             39.2   05-02    139  |  105  |  9   ----------------------------<  102<H>  3.4<L>   |  30  |  0.63    Ca    9.3      02 May 2022 23:31    TPro  7.4  /  Alb  3.8  /  TBili  0.7  /  DBili  x   /  AST  18  /  ALT  23  /  AlkPhos  64  05-02  < from: CT Abdomen and Pelvis w/ IV Cont (05.02.22 @ 23:55) >  IMPRESSION:    Reidentified submucosal fatty deposition of terminal ileum likely sequela   of prior infectious or inflammatory process. No perienteric stranding or   abscess identified. No bowel obstruction.    Mild gastric wall thickening, difficult to assess secondary to inadequate   distention. Consider nonemergent upper endoscopy evaluation to exclude   underlying neoplasm.    Normal appendix.      < end of copied text >

## 2022-05-03 NOTE — ED PROVIDER NOTE - PROGRESS NOTE DETAILS
case d/w Colorectal and will have resident evaluate pt pt evaluated by surgery resident and recommend d/c home with script for her constipation and pt has appt with Frida tomorrow.

## 2022-05-04 ENCOUNTER — APPOINTMENT (OUTPATIENT)
Dept: COLORECTAL SURGERY | Facility: CLINIC | Age: 57
End: 2022-05-04
Payer: COMMERCIAL

## 2022-05-04 LAB
CULTURE RESULTS: SIGNIFICANT CHANGE UP
SPECIMEN SOURCE: SIGNIFICANT CHANGE UP

## 2022-05-04 PROCEDURE — 99214 OFFICE O/P EST MOD 30 MIN: CPT

## 2022-05-04 NOTE — REVIEW OF SYSTEMS
[As Noted in HPI] : as noted in HPI [Abdominal Pain] : abdominal pain [Vomiting] : no vomiting [Negative] : Heme/Lymph [FreeTextEntry7] : Intermittent episodes of right lower quadrant abdominal pain

## 2022-05-04 NOTE — HISTORY OF PRESENT ILLNESS
[FreeTextEntry1] : This was a telehealth office visit. 56-year-old female with a previous history of small bowel intussusception.Recent workup has included a capsule endoscopy ordered by her gastrocolic just which was normal MRI which is normal small bowel series which is normal. Patient still has intermittent episodes of right lower quadrant pain most recently went to the hospital had a CT scan which did not show any intussusception however shows persistent small bowel terminal ileum wall thickening.

## 2022-05-04 NOTE — DATA REVIEWED
[FreeTextEntry1] : small bowel endoscopy study was normal\par MRI Chloe\par Small bowel series normal

## 2022-05-16 ENCOUNTER — APPOINTMENT (OUTPATIENT)
Dept: GASTROENTEROLOGY | Facility: CLINIC | Age: 57
End: 2022-05-16
Payer: COMMERCIAL

## 2022-05-16 DIAGNOSIS — K56.1 INTUSSUSCEPTION: ICD-10-CM

## 2022-05-16 DIAGNOSIS — K21.9 GASTRO-ESOPHAGEAL REFLUX DISEASE W/OUT ESOPHAGITIS: ICD-10-CM

## 2022-05-16 DIAGNOSIS — K63.89 OTHER SPECIFIED DISEASES OF INTESTINE: ICD-10-CM

## 2022-05-16 DIAGNOSIS — R10.31 RIGHT LOWER QUADRANT PAIN: ICD-10-CM

## 2022-05-16 PROCEDURE — 99214 OFFICE O/P EST MOD 30 MIN: CPT

## 2022-05-16 RX ORDER — OMEPRAZOLE 20 MG/1
20 TABLET, DELAYED RELEASE ORAL
Qty: 30 | Refills: 2 | Status: ACTIVE | COMMUNITY
Start: 2022-05-16 | End: 1900-01-01

## 2022-05-16 NOTE — ASSESSMENT
[FreeTextEntry1] : IMPRESSION:\par # RLQ pain - [facility] ER visit 3/28/2022, 5/2/2022 - seen colorectal surgeon if pain persists may require diagnostic laparoscopy \par -  CT scan A/P with IV contrast on 5/3/2022: Reidentified submucosa fatty deposition of terminal ileum likely sequela of prior infections or inflammatory process.  Mild gastric wall thickening difficult to assess secondary to inadequate distention - consider non emergent EGD to exclude underlying neoplasm.  \par -  MR enterography on 4/28/2022:  No intrinsic small bowel lesion.  No recurrent intussusception.  No active colitis or enteritis. \par -  Upper GI series on 4/2022:  Normal exam\par -  Capsule endoscopy on 4/8/2022:  There were brief moments where small bowel images were not captured by the capsule, from 00:43:01 to 00:49:30, 1:23:55 to 01:46:03, 02:14:01 to 02:32:01. Visualized small bowel mucosa appeared normal without mass lesions. Cecum reached. \par -  CT scan A/P with oral contrast only 3/29/2022 - oral contrast passed into the colon - previous small bowel intussusception has resolved. No mass, no obstruction\par - CT scan A/P with IV contrast on 3/28/2022 - Lower midline to the RLQ, there is an intussusception of distal small bowel containing fat. At the point of intussusception is a prominent 3.9 x 2.5 cm focal are of solid soft tissue without air. This could be compressed small bowel however a solid mass as an intussusception lead point is not excluded. \par - Colonoscopy in 3/13/2020: Mild hemorrhoids. Good prep. Repeat colonoscopy in 5 years or earlier depending on genetic testing. \par - 3 or 4 colonoscopies prior to 2020. No colon polyps are ever discovered.\par \par # Years of left sided abdominal pain, better after belching or having a BM - last week worsening upper abdominal pain - did not go to ER. \par - EGD 3/13/2020 by Dr. Patterson: Normal GE s/p bx (negative pathology), Mild antral erythema s/p bx (negative for IM and HP), normal duodenum s/p bx (negative for celiac disease) \par \par # Family history of colon cancer\par - Mother had colon cancer in her late 40s. Maternal cousin in 40's had colon cancer. \par - Previously advised genetic testing - contact information for department of medical genetics given. \par \par # Comorbidities: History of asthma\par \par \par PLAN: \par I will plan for an upper endoscopy under monitored anesthesia care to rule out peptic ulcer disease, H.pylori gastritis etc.   Risks, benefits, and alternatives of the procedure were discussed with the patient. Patient understands and agrees to proceed with the planned procedure.\par \par Omeprazole 20 mg once a day - stop two weeks prior to EGD. \par \par Discussed a colonoscopy with deep bowel enteroscopy with Dr. Fransisco Jasmine, Dr. Dyer to r/o terminal ileum pathology - she would like to think about it - contact information given.  She knows to follow up with Colorectal surgeon if ongoing RLQ pain - may required diagnostic laparoscopy. \par \par \par

## 2022-05-16 NOTE — PHYSICAL EXAM
[General Appearance - Alert] : alert [General Appearance - In No Acute Distress] : in no acute distress [Sclera] : the sclera and conjunctiva were normal [PERRL With Normal Accommodation] : pupils were equal in size, round, and reactive to light [Outer Ear] : the ears and nose were normal in appearance [Neck Appearance] : the appearance of the neck was normal [Hearing Threshold Finger Rub Not Hunt] : hearing was normal [Neck Cervical Mass (___cm)] : no neck mass was observed [Auscultation Breath Sounds / Voice Sounds] : lungs were clear to auscultation bilaterally [Heart Sounds] : normal S1 and S2 [Heart Rate And Rhythm] : heart rate was normal and rhythm regular [Heart Sounds Gallop] : no gallops [Murmurs] : no murmurs [Heart Sounds Pericardial Friction Rub] : no pericardial rub [Abdomen Soft] : soft [Bowel Sounds] : normal bowel sounds [Abdomen Tenderness] : non-tender [Abdomen Mass (___ Cm)] : no abdominal mass palpated [Cervical Lymph Nodes Enlarged Posterior Bilaterally] : posterior cervical [Cervical Lymph Nodes Enlarged Anterior Bilaterally] : anterior cervical [Supraclavicular Lymph Nodes Enlarged Bilaterally] : supraclavicular [Abnormal Walk] : normal gait [Nail Clubbing] : no clubbing  or cyanosis of the fingernails [Skin Color & Pigmentation] : normal skin color and pigmentation [] : no rash [Oriented To Time, Place, And Person] : oriented to person, place, and time [Impaired Insight] : insight and judgment were intact [Affect] : the affect was normal

## 2022-05-16 NOTE — HISTORY OF PRESENT ILLNESS
[de-identified] : 57-year-old woman with history of asthma who presents with complaints of abdominal pain and changes in her bowel habits.\par \par She went to the hospital on 2022 - RLQ pain - pain lasted for 6 hours before going to ER - had CT that showed fat in the submucosa a of terminal ileum.  Pain level peaked at 8/10 on pain scale. \par \par Last week upper abdominal pain with burning 10/10 on pain scale - lasted all day. \par \par \par \par Initial office visit 2019: \par The patient reports that her mother had colon cancer in her late 40s. Maternal cousin in 40's had colon cancer. She says her father  from liver cirrhosis. \par \par She's had about 3 or 4 colonoscopies in her lifetime. Her last colonoscopy was 5 years ago. No colon polyps are ever discovered.\par \par She's had an upper endoscopy more than 5 years ago. From her recollection nothing serious was seen.\par \par She says she's had digestive issues since being a young child. For the past year or more she's had left sided abdominal pain. She says this pain can last for several hours. She says the pain is 5/10 on a pain scale. At times pain gets better after belching or having a bowel movement. \par \par She's also noticed a change in her bowel habits since the past 6 months. She used to go to bathroom once a day. She now goes to the bathroom maybe every 3 days. She does not feel empty after a bowel movement. She denies any melena, hematochezia, abnormal weight loss.\par \par She says after eating certain foods like salads she will have abdominal pain.\par \par Yesterday for the first time she was nauseous all day. She denies any vomiting.\par \par She denies any heartburn, reflux, regurgitation, dysphagia, and odynophagia.\par \par She is worried about leaky gut. \par  \par \par Office visit 2022: \channing Went to [facility] on 3/28/2022 for abdominal pain - RLQ - 4th episode (first ER visit) - feels like gas- bloated - then pain - sever pain - symptoms can last for several hours. She gets nausea but no vomiting. \par \par She gets constipation has been having irregular bowel movements since 2022. \par Patient denies having weight loss, melena and hematochezia.\par \par \par Discussion/Summary 2022: \par Feels a "twinge" in the right lower quadrant - no pain. \par No vomiting, no constipation.\par \par Here for a capsule endoscopy - risks of bowel obstruction reviewed requiring an operation - alternatives discussed again such as a MR enterography, small bowel follow through ordered by colorectal surgeon recently (she is scheduled for GI series next week) - after knowing risks with her niece present, she agrees to have a capsule endoscopy - advised she may have to cancel upcoming [place] trip next week depending on findings of capsule endoscopy and GI series. She says she leaving to [place] on Thursday - GI series on [facility] - advised against [place] trip until results of studies. \par \par \par \par Discussion/Summary 2022: \par Called patient and discussed recent Capsule endoscopy: \par \par Prior abnormal imaging. CT scan A/P on 3/28/2022 revealed intussusception of distal small bowel. R/o small bowel lesion. \par \par There were brief moments where small bowel images were not captured by the capsule, from 00:43:01 to 00:49:30, 1:23:55 to 01:46:03, 02:14:01 to 02:32:01. Visualized small bowel mucosa appeared normal without mass lesions. Cecum reached. \par \par Consider repeat capsule endoscopy. Follow up MR enterography. She would like to follow up with us in the office. \par

## 2022-06-06 ENCOUNTER — APPOINTMENT (OUTPATIENT)
Dept: GASTROENTEROLOGY | Facility: CLINIC | Age: 57
End: 2022-06-06

## 2022-06-17 ENCOUNTER — APPOINTMENT (OUTPATIENT)
Dept: GASTROENTEROLOGY | Facility: CLINIC | Age: 57
End: 2022-06-17

## 2022-09-23 ENCOUNTER — APPOINTMENT (OUTPATIENT)
Dept: GASTROENTEROLOGY | Facility: HOSPITAL | Age: 57
End: 2022-09-23

## 2023-10-28 NOTE — ED STATDOCS - CROS ED CONS ALL NEG
CONSTITUTIONAL: No weakness, fevers or chills  EYES/ENT: No visual changes;  No vertigo or throat pain   NECK: No pain or stiffness  RESPIRATORY: No cough, wheezing, hemoptysis; No shortness of breath  CARDIOVASCULAR: No chest pain or palpitations  GASTROINTESTINAL: No abdominal or epigastric pain. No nausea, vomiting, or hematemesis; No diarrhea or constipation. No melena or hematochezia.  GENITOURINARY: No dysuria, frequency or hematuria  NEUROLOGICAL: No numbness or weakness  SKIN: No itching, rashes
- - -

## 2023-12-07 ENCOUNTER — APPOINTMENT (OUTPATIENT)
Dept: ORTHOPEDIC SURGERY | Facility: CLINIC | Age: 58
End: 2023-12-07
Payer: COMMERCIAL

## 2023-12-07 VITALS — BODY MASS INDEX: 26.8 KG/M2 | WEIGHT: 157 LBS | HEIGHT: 64 IN

## 2023-12-07 DIAGNOSIS — S83.281A OTHER TEAR OF LATERAL MENISCUS, CURRENT INJURY, RIGHT KNEE, INITIAL ENCOUNTER: ICD-10-CM

## 2023-12-07 DIAGNOSIS — M25.569 PAIN IN UNSPECIFIED KNEE: ICD-10-CM

## 2023-12-07 PROCEDURE — 99203 OFFICE O/P NEW LOW 30 MIN: CPT

## 2023-12-07 PROCEDURE — 73564 X-RAY EXAM KNEE 4 OR MORE: CPT | Mod: RT

## 2023-12-10 PROBLEM — S83.281A TEAR OF LATERAL MENISCUS OF RIGHT KNEE, CURRENT, UNSPECIFIED TEAR TYPE, INITIAL ENCOUNTER: Status: ACTIVE | Noted: 2023-12-10

## 2025-04-16 ENCOUNTER — APPOINTMENT (OUTPATIENT)
Dept: PHYSICAL MEDICINE AND REHAB | Facility: CLINIC | Age: 60
End: 2025-04-16
Payer: COMMERCIAL

## 2025-04-16 DIAGNOSIS — M23.300 OTHER MENISCUS DERANGEMENTS, UNSPECIFIED LATERAL MENISCUS, RIGHT KNEE: ICD-10-CM

## 2025-04-16 DIAGNOSIS — M17.11 UNILATERAL PRIMARY OSTEOARTHRITIS, RIGHT KNEE: ICD-10-CM

## 2025-04-16 PROCEDURE — 99203 OFFICE O/P NEW LOW 30 MIN: CPT

## 2025-04-16 RX ORDER — MELOXICAM 7.5 MG/1
7.5 TABLET ORAL TWICE DAILY
Qty: 50 | Refills: 0 | Status: ACTIVE | COMMUNITY
Start: 2025-04-16 | End: 1900-01-01